# Patient Record
Sex: MALE | Race: WHITE | NOT HISPANIC OR LATINO | Employment: OTHER | URBAN - METROPOLITAN AREA
[De-identification: names, ages, dates, MRNs, and addresses within clinical notes are randomized per-mention and may not be internally consistent; named-entity substitution may affect disease eponyms.]

---

## 2021-02-08 ENCOUNTER — APPOINTMENT (EMERGENCY)
Dept: RADIOLOGY | Facility: HOSPITAL | Age: 71
End: 2021-02-08
Payer: MEDICARE

## 2021-02-08 ENCOUNTER — HOSPITAL ENCOUNTER (EMERGENCY)
Facility: HOSPITAL | Age: 71
Discharge: HOME | End: 2021-02-08
Attending: EMERGENCY MEDICINE | Admitting: EMERGENCY MEDICINE
Payer: MEDICARE

## 2021-02-08 VITALS
DIASTOLIC BLOOD PRESSURE: 67 MMHG | BODY MASS INDEX: 22.73 KG/M2 | WEIGHT: 153.44 LBS | SYSTOLIC BLOOD PRESSURE: 124 MMHG | HEART RATE: 75 BPM | TEMPERATURE: 98.4 F | HEIGHT: 69 IN | RESPIRATION RATE: 18 BRPM | OXYGEN SATURATION: 99 %

## 2021-02-08 DIAGNOSIS — S46.211A RUPTURE OF RIGHT BICEPS TENDON, INITIAL ENCOUNTER: Primary | ICD-10-CM

## 2021-02-08 LAB
ANION GAP SERPL CALCULATED.3IONS-SCNC: 8 MMOL/L (ref 4–13)
APTT PPP: 25 SECONDS (ref 23–37)
BASOPHILS # BLD AUTO: 0.03 THOUSANDS/ΜL (ref 0–0.1)
BASOPHILS NFR BLD AUTO: 0 % (ref 0–1)
BUN SERPL-MCNC: 18 MG/DL (ref 5–25)
CALCIUM SERPL-MCNC: 8.4 MG/DL (ref 8.3–10.1)
CHLORIDE SERPL-SCNC: 101 MMOL/L (ref 100–108)
CO2 SERPL-SCNC: 30 MMOL/L (ref 21–32)
CREAT SERPL-MCNC: 1.06 MG/DL (ref 0.6–1.3)
EOSINOPHIL # BLD AUTO: 0.02 THOUSAND/ΜL (ref 0–0.61)
EOSINOPHIL NFR BLD AUTO: 0 % (ref 0–6)
ERYTHROCYTE [DISTWIDTH] IN BLOOD BY AUTOMATED COUNT: 11.9 % (ref 11.6–15.1)
GFR SERPL CREATININE-BSD FRML MDRD: 70 ML/MIN/1.73SQ M
GLUCOSE SERPL-MCNC: 133 MG/DL (ref 65–140)
HCT VFR BLD AUTO: 37.6 % (ref 36.5–49.3)
HGB BLD-MCNC: 12.3 G/DL (ref 12–17)
IMM GRANULOCYTES # BLD AUTO: 0.04 THOUSAND/UL (ref 0–0.2)
IMM GRANULOCYTES NFR BLD AUTO: 0 % (ref 0–2)
INR PPP: 1.08 (ref 0.84–1.19)
LYMPHOCYTES # BLD AUTO: 0.79 THOUSANDS/ΜL (ref 0.6–4.47)
LYMPHOCYTES NFR BLD AUTO: 8 % (ref 14–44)
MCH RBC QN AUTO: 31.2 PG (ref 26.8–34.3)
MCHC RBC AUTO-ENTMCNC: 32.7 G/DL (ref 31.4–37.4)
MCV RBC AUTO: 95 FL (ref 82–98)
MONOCYTES # BLD AUTO: 0.44 THOUSAND/ΜL (ref 0.17–1.22)
MONOCYTES NFR BLD AUTO: 4 % (ref 4–12)
NEUTROPHILS # BLD AUTO: 8.77 THOUSANDS/ΜL (ref 1.85–7.62)
NEUTS SEG NFR BLD AUTO: 88 % (ref 43–75)
NRBC BLD AUTO-RTO: 0 /100 WBCS
PLATELET # BLD AUTO: 234 THOUSANDS/UL (ref 149–390)
PMV BLD AUTO: 9.5 FL (ref 8.9–12.7)
POTASSIUM SERPL-SCNC: 3.8 MMOL/L (ref 3.5–5.3)
PROTHROMBIN TIME: 13.9 SECONDS (ref 11.6–14.5)
RBC # BLD AUTO: 3.94 MILLION/UL (ref 3.88–5.62)
SODIUM SERPL-SCNC: 139 MMOL/L (ref 136–145)
WBC # BLD AUTO: 10.09 THOUSAND/UL (ref 4.31–10.16)

## 2021-02-08 PROCEDURE — 73201 CT UPPER EXTREMITY W/DYE: CPT

## 2021-02-08 PROCEDURE — 99283 EMERGENCY DEPT VISIT LOW MDM: CPT | Performed by: PHYSICIAN ASSISTANT

## 2021-02-08 PROCEDURE — 80048 BASIC METABOLIC PNL TOTAL CA: CPT | Performed by: PHYSICIAN ASSISTANT

## 2021-02-08 PROCEDURE — 99284 EMERGENCY DEPT VISIT MOD MDM: CPT

## 2021-02-08 PROCEDURE — 36415 COLL VENOUS BLD VENIPUNCTURE: CPT | Performed by: PHYSICIAN ASSISTANT

## 2021-02-08 PROCEDURE — 85610 PROTHROMBIN TIME: CPT | Performed by: PHYSICIAN ASSISTANT

## 2021-02-08 PROCEDURE — 85730 THROMBOPLASTIN TIME PARTIAL: CPT | Performed by: PHYSICIAN ASSISTANT

## 2021-02-08 PROCEDURE — 85025 COMPLETE CBC W/AUTO DIFF WBC: CPT | Performed by: PHYSICIAN ASSISTANT

## 2021-02-08 RX ADMIN — IOHEXOL 100 ML: 350 INJECTION, SOLUTION INTRAVENOUS at 09:38

## 2021-02-08 NOTE — ED PROVIDER NOTES
History  Chief Complaint   Patient presents with    Arm Pain     Pt reports bruising and arm pain that stsarted yesterday after shoveling snow  Pt noted with swollen and ecchymotic right arm  68 y/o male presenting today with right upper extremity pain bruising and swelling that began yesterday at 5:00 p m  Sophia Serum Patient states that over the past week he has been shoveling snow and he is right-hand dominant  Did not have distinct pain while shoveling however when he was in the shower and went to go lift his arm he felt a pull in the right upper extremity  Since that point has had worsening swelling bruising  Does not have a significant amount of pain or weakness  Patient states that he is generally healthy is not on any medications including blood thinners  Denies numbness, paresthesias, chest pain, shortness of breath, abdominal pain  Prior to Admission Medications   Prescriptions Last Dose Informant Patient Reported? Taking? Omega-3 Fatty Acids (FISH OIL PO)  Self Yes Yes   Sig: Take by mouth   VITAMIN D PO  Self Yes Yes   Sig: Take 1,000 mg by mouth      Facility-Administered Medications: None       History reviewed  No pertinent past medical history  History reviewed  No pertinent surgical history  History reviewed  No pertinent family history  I have reviewed and agree with the history as documented  E-Cigarette/Vaping    E-Cigarette Use Never User      E-Cigarette/Vaping Substances     Social History     Tobacco Use    Smoking status: Former Smoker    Smokeless tobacco: Never Used   Substance Use Topics    Alcohol use: Yes     Frequency: Monthly or less     Comment: socially    Drug use: Never       Review of Systems   Constitutional: Negative  HENT: Negative  Eyes: Negative  Respiratory: Negative  Cardiovascular: Negative  Gastrointestinal: Negative  Genitourinary: Negative  Musculoskeletal: Positive for arthralgias and joint swelling   Negative for back pain, gait problem, myalgias, neck pain and neck stiffness  Skin: Positive for color change  Negative for pallor, rash and wound  Neurological: Negative  All other systems reviewed and are negative  Physical Exam  Physical Exam  Vitals signs and nursing note reviewed  Constitutional:       Appearance: Normal appearance  He is normal weight  HENT:      Head: Normocephalic and atraumatic  Right Ear: External ear normal       Left Ear: External ear normal       Nose: Nose normal       Mouth/Throat:      Mouth: Mucous membranes are moist       Pharynx: Oropharynx is clear  Neck:      Musculoskeletal: Normal range of motion  Cardiovascular:      Rate and Rhythm: Normal rate  Pulses: Normal pulses  Heart sounds: Normal heart sounds  Pulmonary:      Effort: Pulmonary effort is normal       Breath sounds: Normal breath sounds  Comments: S PO2 is 100% indicating adequate oxygenation  Skin:     General: Skin is warm and dry  Capillary Refill: Capillary refill takes less than 2 seconds  Coloration: Skin is not jaundiced or pale  Findings: Bruising present  No erythema, lesion or rash  Neurological:      General: No focal deficit present  Mental Status: He is alert  Mental status is at baseline     Psychiatric:         Mood and Affect: Mood normal          Vital Signs  ED Triage Vitals [02/08/21 0826]   Temperature Pulse Respirations Blood Pressure SpO2   98 2 °F (36 8 °C) 77 18 152/67 100 %      Temp Source Heart Rate Source Patient Position - Orthostatic VS BP Location FiO2 (%)   Temporal Monitor Sitting Left arm --      Pain Score       6           Vitals:    02/08/21 0826 02/08/21 0853 02/08/21 1100   BP: 152/67 129/69 124/67   Pulse: 77 70 75   Patient Position - Orthostatic VS: Sitting Sitting Sitting         Visual Acuity      ED Medications  Medications   iohexol (OMNIPAQUE) 350 MG/ML injection (SINGLE-DOSE) 100 mL (100 mL Intravenous Given 2/8/21 6246)       Diagnostic Studies  Results Reviewed     Procedure Component Value Units Date/Time    Protime-INR [[id number]]  (Normal) Collected: 02/08/21 0907    Lab Status: Final result Specimen: Blood from Arm, Left Updated: 02/08/21 0925     Protime 13 9 seconds      INR 1 08    APTT [[id number]]  (Normal) Collected: 02/08/21 0907    Lab Status: Final result Specimen: Blood from Arm, Left Updated: 02/08/21 0925     PTT 25 seconds     Basic metabolic panel [[id number]] Collected: 02/08/21 0907    Lab Status: Final result Specimen: Blood from Arm, Left Updated: 02/08/21 0924     Sodium 139 mmol/L      Potassium 3 8 mmol/L      Chloride 101 mmol/L      CO2 30 mmol/L      ANION GAP 8 mmol/L      BUN 18 mg/dL      Creatinine 1 06 mg/dL      Glucose 133 mg/dL      Calcium 8 4 mg/dL      eGFR 70 ml/min/1 73sq m     Narrative:      [place] guidelines for Chronic Kidney Disease (CKD):     Stage 1 with normal or high GFR (GFR > 90 mL/min/1 73 square meters)    Stage 2 Mild CKD (GFR = 60-89 mL/min/1 73 square meters)    Stage 3A Moderate CKD (GFR = 45-59 mL/min/1 73 square meters)    Stage 3B Moderate CKD (GFR = 30-44 mL/min/1 73 square meters)    Stage 4 Severe CKD (GFR = 15-29 mL/min/1 73 square meters)    Stage 5 End Stage CKD (GFR <15 mL/min/1 73 square meters)  Note: GFR calculation is accurate only with a steady state creatinine    CBC and differential [[id number]]  (Abnormal) Collected: 02/08/21 0907    Lab Status: Final result Specimen: Blood from Arm, Left Updated: 02/08/21 0914     WBC 10 09 Thousand/uL      RBC 3 94 Million/uL      Hemoglobin 12 3 g/dL      Hematocrit 37 6 %      MCV 95 fL      MCH 31 2 pg      MCHC 32 7 g/dL      RDW 11 9 %      MPV 9 5 fL      Platelets 309 Thousands/uL      nRBC 0 /100 WBCs      Neutrophils Relative 88 %      Immat GRANS % 0 %      Lymphocytes Relative 8 %      Monocytes Relative 4 %      Eosinophils Relative 0 %      Basophils Relative 0 % Neutrophils Absolute 8 77 Thousands/µL      Immature Grans Absolute 0 04 Thousand/uL      Lymphocytes Absolute 0 79 Thousands/µL      Monocytes Absolute 0 44 Thousand/µL      Eosinophils Absolute 0 02 Thousand/µL      Basophils Absolute 0 03 Thousands/µL                  CT upper extremity w contrast right   Final Result by Mo Marquez MD (02/08 1018)      Probable complete rupture of the proximal long head biceps tendon with retraction into the distal arm  Associated hemorrhage and edema in the biceps musculature  Distal biceps tendon grossly intact  Probable grade 2 strain of the proximal short head of the biceps muscle  Subcutaneous edema along the medial aspect of the arm is almost certainly reactive  No acute osseous abnormality  The study was marked in [facility] for immediate notification  Workstation performed: [id number]                    Procedures  Procedures         ED Course                             SBIRT 20yo+      Most Recent Value   SBIRT (24 yo +)   In order to provide better care to our patients, we are screening all of our patients for alcohol and drug use  Would it be okay to ask you these screening questions? No Filed at: 02/08/2021 0941                    MDM  Number of Diagnoses or Management Options  Rupture of right biceps tendon, initial encounter:   Diagnosis management comments: Reached out to Dr El Wheatley regarding imaging studies and he would like patient to follow up with Dr Jennifer Martinez in the outpatient office and states non operative management  Patient for comfort was placed in a sling and assessed by me with good neurovascular exam before and after and given education regarding how to use a sling and ways to avoid a stiff joint  Patient given thorough education regarding biceps tendon rupture and understands that there may be chronic deformity to the right upper extremity given rupture as well as chronic weakness   Patient does however have good ROM and 4/5 strength of RUE  Patient is informed to return to the emergency department for worsening of symptoms such as severe pain, numbness of extremity etc and was given proper education regarding their diagnosis and symptoms  Otherwise the patient is informed to follow up with orthopedics for re-evaluation  The patient verbalizes understanding and agrees with above assessment and plan  All questions were answered  Please Note: Fluency Direct voice recognition software may have been used in the creation of this document  Wrong words or sound a like substitutions may have occurred due to the inherent limitations of the voice software  Amount and/or Complexity of Data Reviewed  Clinical lab tests: ordered and reviewed  Tests in the radiology section of CPT®: reviewed and ordered  Review and summarize past medical records: yes  Independent visualization of images, tracings, or specimens: yes        Disposition  Final diagnoses:   Rupture of right biceps tendon, initial encounter     Time reflects when diagnosis was documented in both MDM as applicable and the Disposition within this note     Time User Action Codes Description Comment    2/8/2021 10:42 AM Fadumo Herron Add [E73 [id number]] Rupture of right biceps tendon, initial encounter       ED Disposition     ED Disposition Condition Date/Time Comment    Discharge Stable Mon Feb 8, 2021 10:42 AM Britta Madden discharge to home/self care              Follow-up Information     Follow up With Specialties Details Why Contact Info Additional [address] Emergency Department Emergency Medicine Go to  If symptoms worsen such as numbness of extremitiy, chest pain, shortness of breath etc [address] [facility] Emergency Department, [facility], [address]    Devon Costa MD Orthopedic Surgery Schedule an appointment as soon as possible for a visit in 1 day  [address] [facility]  [phone]             Patient's Medications   Discharge Prescriptions    No medications on file     No discharge procedures on file      PDMP Review     None          ED Provider  Electronically Signed by           Lam Saleh PA-C  02/08/21 1111

## 2021-02-09 ENCOUNTER — HOSPITAL ENCOUNTER (OUTPATIENT)
Dept: RADIOLOGY | Facility: HOSPITAL | Age: 71
Discharge: HOME | End: 2021-02-09
Attending: ORTHOPAEDIC SURGERY
Payer: MEDICARE

## 2021-02-09 VITALS
WEIGHT: 154.6 LBS | HEART RATE: 64 BPM | SYSTOLIC BLOOD PRESSURE: 149 MMHG | HEIGHT: 69 IN | BODY MASS INDEX: 22.9 KG/M2 | DIASTOLIC BLOOD PRESSURE: 80 MMHG

## 2021-02-09 DIAGNOSIS — M79.89 LEFT UPPER EXTREMITY SWELLING: ICD-10-CM

## 2021-02-09 DIAGNOSIS — M24.9 INTERNAL DERANGEMENT OF ELBOW: ICD-10-CM

## 2021-02-09 DIAGNOSIS — M79.601 PAIN AND SWELLING OF RIGHT UPPER EXTREMITY: ICD-10-CM

## 2021-02-09 DIAGNOSIS — M79.89 PAIN AND SWELLING OF RIGHT UPPER EXTREMITY: ICD-10-CM

## 2021-02-09 DIAGNOSIS — M24.9 INTERNAL DERANGEMENT OF ELBOW: Primary | ICD-10-CM

## 2021-02-09 PROCEDURE — 99204 OFFICE O/P NEW MOD 45 MIN: CPT | Performed by: ORTHOPAEDIC SURGERY

## 2021-02-09 PROCEDURE — 73221 MRI JOINT UPR EXTREM W/O DYE: CPT

## 2021-02-09 PROCEDURE — G1004 CDSM NDSC: HCPCS

## 2021-02-09 NOTE — PROGRESS NOTES
Assessment/Plan:  1  Internal derangement of elbow  MRI elbow right wo contrast   2  Pain and swelling of right upper extremity  MRI elbow right wo contrast       Scribe Attestation    I,:  Susana Roberts am acting as a scribe while in the presence of the attending physician :       I,:  Ilene Kennedy MD personally performed the services described in this documentation    as scribed in my presence :         Martin Rubinstein upon examination in review the CT scan of the right upper extremity does demonstrate signs and symptoms that are concerning for a distal biceps tendon rupture  He demonstrates significant weakness into supination  In addition,  His distal biceps tendon is not well palpated on exam or during hook test    The biceps does appear to have a proximal retraction from the elbow verses a distal retraction from the shoulder  The CT scan demonstrates moderate soft tissue swelling at the elbow with poor visualization of the distal biceps tendon  As I do have significant concern for distal biceps tendon rupture would like to order an MRI of the right elbow to question this today  My office will help facilitate having this MRI completed as soon as possible  I would like Martin Rubinstein to follow up when the MRI of his right elbow is completed  Subjective:   Ruba Anders is a 69 y o  male who presents to the office today for   Initial evaluation of his right arm and elbow  He notes that on 2/7/2021 he was shoveling the snow and had some discomfort to his right arm  He denies a traumatic pop at that time  However later that evening when he was in the shower he tried to wash his hair and that when he experienced a painful pulling sensation into the anterior aspect of his shoulder and arm  This did result in significant bruising developing into the upper arm extending into the forearm, and wrist   He states that he has limitations with strength with trying to turn his palm up as well as elbow flexion    He notes that his shoulder has maintain range of motion and strength  He has also noticed a increased definition of the biceps area since the incident in the shower  Aram Aviles does recount an incident earlier in the year 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 he did over extend his right arm I did have a painful pulling and pop sensation into the anterior aspect of his elbow  However this did resolve its own with no development of bruising or swelling  Today he denies any distal paresthesias  He is not on any anticoagulants  He was evaluated at the emergency department and did receive a CT scan of the right upper extremity  Review of Systems   Constitutional: Negative for chills, fever and unexpected weight change  HENT: Negative for hearing loss, nosebleeds and sore throat  Eyes: Negative for pain, redness and visual disturbance  Respiratory: Negative for cough, shortness of breath and wheezing  Cardiovascular: Negative for chest pain, palpitations and leg swelling  Gastrointestinal: Negative for abdominal pain, nausea and vomiting  Endocrine: Negative for polyphagia and polyuria  Genitourinary: Negative for dysuria and hematuria  Musculoskeletal: Positive for arthralgias and myalgias  See HPI   Skin: Negative for rash and wound  Neurological: Negative for dizziness, numbness and headaches  Psychiatric/Behavioral: Negative for decreased concentration and suicidal ideas  The patient is not nervous/anxious  History reviewed  No pertinent past medical history  History reviewed  No pertinent surgical history      Family History   Problem Relation Age of Onset    No Known Problems Mother     No Known Problems Father        Social History     Occupational History    Not on file   Tobacco Use    Smoking status: Former Smoker    Smokeless tobacco: Never Used   Substance and Sexual Activity    Alcohol use: Yes     Frequency: Monthly or less     Comment: socially    Drug use: Never    Sexual activity: Not on file         Current Outpatient Medications:     Omega-3 Fatty Acids (FISH OIL PO), Take by mouth, Disp: , Rfl:     VITAMIN D PO, Take 1,000 mg by mouth, Disp: , Rfl:     No Known Allergies    Objective:  Vitals:    02/09/21 1007   BP: 149/80   Pulse: 64       Right Elbow Exam     Tenderness   Right elbow tenderness location: distal biceps  Range of Motion   Right elbow extension: -5    Flexion: 110   Pronation: 0   Supination: 110     Muscle Strength   Pronation:  5/5   Supination:  3/5     Other   Erythema: absent  Scars: absent  Sensation: normal  Pulse: present    Comments:  Moderate to severe ecchymosis of the anterior and medial upper arm extending into the forearm and wrist     Distal biceps is not well palpated during with Hook test    Muscle bundling of the biceps with appearance of proximal retraction from the elbow            Physical Exam  Vitals signs reviewed  HENT:      Head: Normocephalic and atraumatic  Eyes:      General:         Right eye: No discharge  Left eye: No discharge  Conjunctiva/sclera: Conjunctivae normal       Pupils: Pupils are equal, round, and reactive to light  Neck:      Musculoskeletal: Normal range of motion and neck supple  Cardiovascular:      Rate and Rhythm: Normal rate  Pulmonary:      Effort: Pulmonary effort is normal  No respiratory distress  Musculoskeletal:      Comments: As noted in HPI   Skin:     General: Skin is warm and dry  Neurological:      Mental Status: He is alert and oriented to person, place, and time  Psychiatric:         Mood and Affect: Mood normal          Behavior: Behavior normal          I have personally reviewed pertinent films in PACS and my interpretation is as follows:     CT scan of the right upper extremity demonstrates   Moderate soft tissue swelling at the elbow    Distal biceps is not well visualized

## 2021-02-10 VITALS
HEIGHT: 69 IN | HEART RATE: 68 BPM | WEIGHT: 154.6 LBS | SYSTOLIC BLOOD PRESSURE: 126 MMHG | BODY MASS INDEX: 22.9 KG/M2 | DIASTOLIC BLOOD PRESSURE: 59 MMHG

## 2021-02-10 DIAGNOSIS — S46.211D BICEPS MUSCLE TEAR, RIGHT, SUBSEQUENT ENCOUNTER: Primary | ICD-10-CM

## 2021-02-10 DIAGNOSIS — M25.421 SWELLING OF JOINT OF UPPER ARM, RIGHT: ICD-10-CM

## 2021-02-10 PROCEDURE — 99214 OFFICE O/P EST MOD 30 MIN: CPT | Performed by: ORTHOPAEDIC SURGERY

## 2021-02-10 NOTE — PROGRESS NOTES
Assessment/Plan:  1  Biceps muscle tear, right, subsequent encounter     2  Swelling of joint of upper arm, right         Scribe Attestation    I,:  Susana Alejandra am acting as a scribe while in the presence of the attending physician :       I,:  Ilene Kennedy MD personally performed the services described in this documentation    as scribed in my presence :             Martin Rubinstein upon examination and review the MRI of the right elbow does demonstrate intact distal biceps tendon  There is edema present in the muscle belly of the biceps  I did remark that this is likely the cause of the significant swelling and bruising  I did note that the bleeding as result of the muscle injury is within a tissue plane that has resulted in extensive extension into the arm as well as into the chest and rib area  He does not demonstrate any point tenderness at the coracoid today as there is concern of a grade 2 strain of the biceps short head on previous CT  Given the findings on the MRI as well as his functional movement of the shoulder and elbow, I do not recommend surgical intervention at this time  I do believe that he will continue to improve in regards to the swelling and bruising as time goes on and encouraged him to continue moving the right upper extremity  I did remark however that I would pay attention to possible symptoms such as dizziness, lightheadedness, or feeling faint as he did bleed quite a bit from his injury  His labs do demonstrate normal levels of hemoglobin and hematocrit  I did remark that should he develop those symptoms, he should follow up in the emergency department and have his labs drawn as these levels may be affected due to the blood loss  I did also advise him to ice his biceps for approximately 15 minutes that time 2 to 3 times a day  Otherwise, he may resume activities of daily living as tolerated    Martin Rubinstein verbalized understanding of all information provided to him today and had no further questions  I will see him back on an as-needed basis  Subjective:   Leona Varghese is a 69 y o  male who presents to the office today for  Follow-up evaluation of his right elbow, and arm  He was evaluated yesterday 2/9/2021 as result of bruising that developed following shoveling of the snow on 2/7/2021  He states that the bruising has extended into the thumb area  He states that he is pain-free and notes that his range of motion of the elbow has improved since yesterday's evaluation  He denies any distal paresthesias  He notes that he is able to actively range his shoulder with little difficulty and notes that his elbow slightly restricted secondary to swelling  He denies any symptoms of dizziness, or nausea  He does remark that he did have labs drawn at the hospital when he was evaluated on 2/8/2021  He did have an MRI of his right elbow completed yesterday to be reviewed today  Review of Systems   Constitutional: Negative for chills, fever and unexpected weight change  HENT: Negative for hearing loss, nosebleeds and sore throat  Eyes: Negative for pain, redness and visual disturbance  Respiratory: Negative for cough, shortness of breath and wheezing  Cardiovascular: Negative for chest pain, palpitations and leg swelling  Gastrointestinal: Negative for abdominal pain, nausea and vomiting  Endocrine: Negative for polyphagia and polyuria  Genitourinary: Negative for dysuria and hematuria  Musculoskeletal: Positive for myalgias (mild soreness arm)  See HPI   Skin: Negative for rash and wound  Neurological: Negative for dizziness, numbness and headaches  Psychiatric/Behavioral: Negative for decreased concentration and suicidal ideas  The patient is not nervous/anxious  History reviewed  No pertinent past medical history  History reviewed  No pertinent surgical history      Family History   Problem Relation Age of Onset    No Known Problems Mother     No Known Problems Father        Social History     Occupational History    Not on file   Tobacco Use    Smoking status: Former Smoker    Smokeless tobacco: Never Used   Substance and Sexual Activity    Alcohol use: Yes     Frequency: Monthly or less     Comment: socially    Drug use: Never    Sexual activity: Not on file         Current Outpatient Medications:     Omega-3 Fatty Acids (FISH OIL PO), Take by mouth, Disp: , Rfl:     VITAMIN D PO, Take 1,000 mg by mouth, Disp: , Rfl:     No Known Allergies    Objective:  Vitals:    02/10/21 0925   BP: 126/59   Pulse: 68       Right Elbow Exam     Range of Motion   Extension: 0   Flexion: 120     Other   Erythema: absent  Scars: absent  Sensation: normal  Pulse: present    Comments:    Elbow flexion:  5/5     moderate ecchymosis of the upper arm extending into the forearm, wrist and thenar eminence  Right Shoulder Exam     Tenderness   The patient is experiencing no tenderness  Range of Motion   Active abduction: 170   External rotation: 70   Internal rotation 0 degrees: L3     Muscle Strength   Abduction: 5/5   Internal rotation: 5/5   External rotation: 5/5             Physical Exam  Vitals signs reviewed  HENT:      Head: Normocephalic and atraumatic  Eyes:      General:         Right eye: No discharge  Left eye: No discharge  Conjunctiva/sclera: Conjunctivae normal       Pupils: Pupils are equal, round, and reactive to light  Neck:      Musculoskeletal: Normal range of motion and neck supple  Cardiovascular:      Rate and Rhythm: Normal rate  Pulmonary:      Effort: Pulmonary effort is normal  No respiratory distress  Musculoskeletal:      Comments: As noted in HPI   Skin:     General: Skin is warm and dry  Neurological:      Mental Status: He is alert and oriented to person, place, and time     Psychiatric:         Mood and Affect: Mood normal          Behavior: Behavior normal          I have personally reviewed pertinent films in PACS and my interpretation is as follows:     MRI of the right elbow demonstrates an intact distal biceps tendon  There is mild signal uptake indicating tendinitis  Edema is present in the biceps muscle suggestive of a strain

## 2023-01-14 PROBLEM — Z00.00 MEDICARE ANNUAL WELLNESS VISIT, SUBSEQUENT: Status: ACTIVE | Noted: 2023-01-14

## 2023-01-14 PROBLEM — H40.013 OPEN ANGLE WITH BORDERLINE FINDINGS, LOW RISK, BILATERAL: Status: ACTIVE | Noted: 2023-01-14

## 2023-01-14 PROBLEM — Z13.83 SCREENING FOR CARDIOVASCULAR, RESPIRATORY, AND GENITOURINARY DISEASES: Status: ACTIVE | Noted: 2023-01-14

## 2023-01-14 PROBLEM — Z13.89 SCREENING FOR CARDIOVASCULAR, RESPIRATORY, AND GENITOURINARY DISEASES: Status: ACTIVE | Noted: 2023-01-14

## 2023-01-14 PROBLEM — Z12.5 PROSTATE CANCER SCREENING: Status: ACTIVE | Noted: 2023-01-14

## 2023-01-14 PROBLEM — Z12.11 COLON CANCER SCREENING: Status: ACTIVE | Noted: 2023-01-14

## 2023-01-14 PROBLEM — Z13.6 SCREENING FOR CARDIOVASCULAR, RESPIRATORY, AND GENITOURINARY DISEASES: Status: ACTIVE | Noted: 2023-01-14

## 2023-01-14 PROBLEM — Z13.1 SCREENING FOR DIABETES MELLITUS (DM): Status: ACTIVE | Noted: 2023-01-14

## 2023-01-18 ENCOUNTER — OFFICE VISIT (OUTPATIENT)
Dept: FAMILY MEDICINE CLINIC | Facility: CLINIC | Age: 73
End: 2023-01-18

## 2023-01-18 VITALS
SYSTOLIC BLOOD PRESSURE: 118 MMHG | DIASTOLIC BLOOD PRESSURE: 70 MMHG | TEMPERATURE: 98 F | OXYGEN SATURATION: 97 % | RESPIRATION RATE: 16 BRPM | HEART RATE: 61 BPM | WEIGHT: 144 LBS | BODY MASS INDEX: 21.82 KG/M2 | HEIGHT: 68 IN

## 2023-01-18 DIAGNOSIS — Z13.83 SCREENING FOR CARDIOVASCULAR, RESPIRATORY, AND GENITOURINARY DISEASES: ICD-10-CM

## 2023-01-18 DIAGNOSIS — Z13.6 SCREENING FOR CARDIOVASCULAR, RESPIRATORY, AND GENITOURINARY DISEASES: ICD-10-CM

## 2023-01-18 DIAGNOSIS — Z12.5 PROSTATE CANCER SCREENING: ICD-10-CM

## 2023-01-18 DIAGNOSIS — R41.3 MEMORY DIFFICULTIES: ICD-10-CM

## 2023-01-18 DIAGNOSIS — Z13.1 SCREENING FOR DIABETES MELLITUS (DM): ICD-10-CM

## 2023-01-18 DIAGNOSIS — Z00.00 MEDICARE ANNUAL WELLNESS VISIT, INITIAL: Primary | ICD-10-CM

## 2023-01-18 DIAGNOSIS — Z12.11 COLON CANCER SCREENING: ICD-10-CM

## 2023-01-18 DIAGNOSIS — Z13.89 SCREENING FOR CARDIOVASCULAR, RESPIRATORY, AND GENITOURINARY DISEASES: ICD-10-CM

## 2023-01-18 DIAGNOSIS — M60.9 MYOSITIS, UNSPECIFIED MYOSITIS TYPE, UNSPECIFIED SITE: ICD-10-CM

## 2023-01-18 NOTE — PROGRESS NOTES
Assessment/Plan:    No problem-specific Assessment & Plan notes found for this encounter  Memory difficulties  Start workup  Check labs  Brain imaging  Neurology referral   Possible neuropsychology testing     Diagnoses and all orders for this visit:    Medicare annual wellness visit, subsequent    Colon cancer screening    Screening for cardiovascular, respiratory, and genitourinary diseases  -     Lipid Panel with Direct LDL reflex; Future    Screening for diabetes mellitus (DM)  -     Comprehensive metabolic panel; Future    Prostate cancer screening  -     PSA, Total Screen; Future    Memory difficulties  -     TSH, 3rd generation; Future  -     CBC and differential; Future  -     Vitamin D 25 hydroxy; Future  -     Vitamin B12; Future  -     CT head w contrast; Future  -     Ambulatory referral to Neurology; Future    Myositis, unspecified myositis type, unspecified site  -     Vitamin D 25 hydroxy; Future        Return in about 1 month (around 2/18/2023) for Recheck  Subjective:      Patient ID: Prerna Constantino is a 67 y o  male  Chief Complaint   Patient presents with   • New Patient Visit     Patients brother states patient is having some confusion  Talks to himself  Loss for words  Angry sometimes(unusual for him), hes usually funny  Obsession with washing dishes  Forgot how to turn on oven  Not eating well and has some recent weight loss  Seems lost      • Establish Care     Patient having some forgetfulness   Laverne Elizabeth MA     • Medicare Wellness Visit     New patient here to establish care  Laverne Elizabeth MA         HPI  Goes to [place]  Lives with wife, she had recent surgery  No meds  No med hx  No surgeries  Had colonoscopy at [place], had polyp in past but not on last one    Eats healthy  No etoh?  1 beer per week  No tob  Active, no program    Last labs over 3y ago at [facility] steady per pt in past ?    Issues with memory  Since March 2022  Some word finding problems  No hx of cva or focal weakness or seizure    No depression/anxiety  No excess worries  Worries about wife though    The following portions of the patient's history were reviewed and updated as appropriate: allergies, current medications, past family history, past medical history, past social history, past surgical history and problem list     Review of Systems   Constitutional: Negative for chills and fever  Neurological: Negative for syncope  Current Outpatient Medications   Medication Sig Dispense Refill   • VITAMIN D PO Take 1,000 mg by mouth       No current facility-administered medications for this visit  Objective:    /70   Pulse 61   Temp 98 °F (36 7 °C)   Resp 16   Ht 5' 8 25" (1 734 m)   Wt 65 3 kg (144 lb)   SpO2 97%   BMI 21 74 kg/m²        Physical Exam  Vitals and nursing note reviewed  Constitutional:       General: He is not in acute distress  Appearance: He is well-developed  He is not ill-appearing  HENT:      Head: Normocephalic  Right Ear: Tympanic membrane normal       Left Ear: Tympanic membrane normal       Mouth/Throat:      Pharynx: No oropharyngeal exudate  Eyes:      General:         Left eye: No discharge  Conjunctiva/sclera: Conjunctivae normal    Cardiovascular:      Rate and Rhythm: Normal rate and regular rhythm  Heart sounds: No murmur heard  Pulmonary:      Effort: Pulmonary effort is normal  No respiratory distress  Breath sounds: No wheezing or rales  Abdominal:      General: There is no distension  Palpations: Abdomen is soft  Tenderness: There is no abdominal tenderness  Musculoskeletal:         General: No deformity  Cervical back: Neck supple  No tenderness  Right lower leg: No edema  Lymphadenopathy:      Cervical: No cervical adenopathy  Skin:     General: Skin is warm and dry  Coloration: Skin is not pale  Neurological:      General: No focal deficit present  Mental Status: He is alert        Cranial Nerves: No cranial nerve deficit  Psychiatric:         Mood and Affect: Mood normal  Mood is not anxious or depressed  Behavior: Behavior normal          Thought Content:  Thought content normal                 Pearl Patino DO

## 2023-01-19 NOTE — PROGRESS NOTES
Assessment and Plan:     Problem List Items Addressed This Visit        Active Problems    Medicare annual wellness visit, subsequent - Primary    Colon cancer screening    Screening for cardiovascular, respiratory, and genitourinary diseases    Relevant Orders    Lipid Panel with Direct LDL reflex    Screening for diabetes mellitus (DM)    Relevant Orders    Comprehensive metabolic panel    Prostate cancer screening    Relevant Orders    PSA, Total Screen    Memory difficulties    Relevant Orders    TSH, 3rd generation    CBC and differential    Vitamin D 25 hydroxy    Vitamin B12    CT head w contrast    Ambulatory referral to Neurology   Other Visit Diagnoses     Myositis, unspecified myositis type, unspecified site        Relevant Orders    Vitamin D 25 hydroxy           Preventive health issues were discussed with patient, and age appropriate screening tests were ordered as noted in patient's After Visit Summary  Personalized health advice and appropriate referrals for health education or preventive services given if needed, as noted in patient's After Visit Summary  History of Present Illness:     Patient presents for a Medicare Wellness Visit    HPI   Patient Care Team:  Yousuf Lo DO as PCP - General (Family Medicine)     Review of Systems:     Review of Systems     Problem List:     Patient Active Problem List   Diagnosis   • Open angle with borderline findings, low risk, bilateral   • Medicare annual wellness visit, subsequent   • Colon cancer screening   • Screening for cardiovascular, respiratory, and genitourinary diseases   • Screening for diabetes mellitus (DM)   • Prostate cancer screening   • Memory difficulties      Past Medical and Surgical History:     History reviewed  No pertinent past medical history  History reviewed  No pertinent surgical history     Family History:     Family History   Problem Relation Age of Onset   • No Known Problems Mother    • No Known Problems Father Social History:     Social History     Socioeconomic History   • Marital status: /Civil Union     Spouse name: None   • Number of children: None   • Years of education: None   • Highest education level: None   Occupational History   • None   Tobacco Use   • Smoking status: Former     Packs/day: 1 00     Types: Cigarettes     Start date: 1952     Quit date: 1989     Years since quittin 9   • Smokeless tobacco: Never   Vaping Use   • Vaping Use: Never used   Substance and Sexual Activity   • Alcohol use: Yes     Comment: socially   • Drug use: Never   • Sexual activity: None   Other Topics Concern   • None   Social History Narrative   • None     Social Determinants of Health     Financial Resource Strain: Low Risk    • Difficulty of Paying Living Expenses: Not hard at all   Food Insecurity: Not on file   Transportation Needs: No Transportation Needs   • Lack of Transportation (Medical): No   • Lack of Transportation (Non-Medical): No   Physical Activity: Not on file   Stress: Not on file   Social Connections: Not on file   Intimate Partner Violence: Not on file   Housing Stability: Not on file      Medications and Allergies:     Current Outpatient Medications   Medication Sig Dispense Refill   • VITAMIN D PO Take 1,000 mg by mouth       No current facility-administered medications for this visit  No Known Allergies   Immunizations:     Immunization History   Administered Date(s) Administered   • Tdap 2012      Health Maintenance:         Topic Date Due   • Hepatitis C Screening  Never done   • Colorectal Cancer Screening  Never done         Topic Date Due   • COVID-19 Vaccine (1) Never done   • Pneumococcal Vaccine: 65+ Years (1 - PCV) Never done      Medicare Screening Tests and Risk Assessments:     Mike Thomas is here for his Initial Wellness visit  Health Risk Assessment:   Patient rates overall health as very good  Patient feels that their physical health rating is same   Patient is satisfied with their life  Eyesight was rated as same  Hearing was rated as slightly worse  Patient feels that their emotional and mental health rating is same  Patients states they are sometimes angry  Patient states they are sometimes unusually tired/fatigued  Pain experienced in the last 7 days has been none  Depression Screening:   PHQ-2 Score: 0      Fall Risk Screening: In the past year, patient has experienced: no history of falling in past year      Home Safety:  Patient does not have trouble with stairs inside or outside of their home  Patient has working smoke alarms and has working carbon monoxide detector  Home safety hazards include: none  Nutrition:   Current diet is Regular  Medications:   Patient is currently taking over-the-counter supplements  OTC medications include: see medication list  Patient is not able to manage medications  Activities of Daily Living (ADLs)/Instrumental Activities of Daily Living (IADLs):   Walk and transfer into and out of bed and chair?: Yes  Dress and groom yourself?: Yes    Bathe or shower yourself?: Yes    Feed yourself? Yes  Do your laundry/housekeeping?: Yes  Manage your money, pay your bills and track your expenses?: Yes  Make your own meals?: Yes    Do your own shopping?: Yes    Previous Hospitalizations:   Any hospitalizations or ED visits within the last 12 months?: No      Advance Care Planning:   Living will: Yes    Durable POA for healthcare:  Yes    Advanced directive: Yes    End of Life Decisions reviewed with patient: No      PREVENTIVE SCREENINGS      Cardiovascular Screening:    General: Risks and Benefits Discussed      Diabetes Screening:     General: Risks and Benefits Discussed      Colorectal Cancer Screening:     General: Risks and Benefits Discussed      Prostate Cancer Screening:    General: Risks and Benefits Discussed      Osteoporosis Screening:    General: Screening Not Indicated      Abdominal Aortic Aneurysm (AAA) Screening:    Risk factors include: age between 73-67 yo and tobacco use        General: Screening Not Indicated      Lung Cancer Screening:     General: Screening Not Indicated      Hepatitis C Screening:    General: Risks and Benefits Discussed    Hep C Screening Accepted: Yes      Screening, Brief Intervention, and Referral to Treatment (SBIRT)    Screening  Typical number of drinks in a day: 1  Typical number of drinks in a week: 1  Interpretation: Low risk drinking behavior  Single Item Drug Screening:  How often have you used an illegal drug (including marijuana) or a prescription medication for non-medical reasons in the past year? never    Single Item Drug Screen Score: 0  Interpretation: Negative screen for possible drug use disorder    Other Counseling Topics:   Car/seat belt/driving safety and regular weightbearing exercise  No results found       Physical Exam:     /70   Pulse 61   Temp 98 °F (36 7 °C)   Resp 16   Ht 5' 8 25" (1 734 m)   Wt 65 3 kg (144 lb)   SpO2 97%   BMI 21 74 kg/m²     Physical Exam     David Malik DO

## 2023-01-19 NOTE — PROGRESS NOTES
Assessment/Plan:    No problem-specific Assessment & Plan notes found for this encounter  Diagnoses and all orders for this visit:    Medicare annual wellness visit, subsequent    Colon cancer screening    Screening for cardiovascular, respiratory, and genitourinary diseases  -     Lipid Panel with Direct LDL reflex; Future    Screening for diabetes mellitus (DM)  -     Comprehensive metabolic panel; Future    Prostate cancer screening  -     PSA, Total Screen; Future    Memory difficulties  -     TSH, 3rd generation; Future  -     CBC and differential; Future  -     Vitamin D 25 hydroxy; Future  -     Vitamin B12; Future  -     CT head w contrast; Future  -     Ambulatory referral to Neurology; Future    Myositis, unspecified myositis type, unspecified site  -     Vitamin D 25 hydroxy; Future              Return in about 1 month (around 2/18/2023) for Recheck  Subjective:      Patient ID: Renetta Russell is a 67 y o  male  Chief Complaint   Patient presents with   • New Patient Visit     Patients brother states patient is having some confusion  Talks to himself  Loss for words  Angry sometimes(unusual for him), hes usually funny  Obsession with washing dishes  Forgot how to turn on oven  Not eating well and has some recent weight loss  Seems lost      • Establish Care     Patient having some forgetfulness   Anderson Rangel MA     • Medicare Wellness Visit     New patient here to establish care  Anderson Rangel MA         HPI    The following portions of the patient's history were reviewed and updated as appropriate: allergies, current medications, past family history, past medical history, past social history, past surgical history and problem list     Review of Systems      Current Outpatient Medications   Medication Sig Dispense Refill   • VITAMIN D PO Take 1,000 mg by mouth       No current facility-administered medications for this visit         Objective:    /70   Pulse 61   Temp 98 °F (36 7 °C) Resp 16   Ht 5' 8 25" (1 734 m)   Wt 65 3 kg (144 lb)   SpO2 97%   BMI 21 74 kg/m²        Physical Exam           Orestes Turpin,

## 2023-01-19 NOTE — PATIENT INSTRUCTIONS
Medicare Preventive Visit Patient Instructions  Thank you for completing your Welcome to Medicare Visit or Medicare Annual Wellness Visit today  Your next wellness visit will be due in one year (1/19/2024)  The screening/preventive services that you may require over the next 5-10 years are detailed below  Some tests may not apply to you based off risk factors and/or age  Screening tests ordered at today's visit but not completed yet may show as past due  Also, please note that scanned in results may not display below  Preventive Screenings:  Service Recommendations Previous Testing/Comments   Colorectal Cancer Screening  · Colonoscopy    · Fecal Occult Blood Test (FOBT)/Fecal Immunochemical Test (FIT)  · Fecal DNA/Cologuard Test  · Flexible Sigmoidoscopy Age: 39-70 years old   Colonoscopy: every 10 years (May be performed more frequently if at higher risk)  OR  FOBT/FIT: every 1 year  OR  Cologuard: every 3 years  OR  Sigmoidoscopy: every 5 years  Screening may be recommended earlier than age 39 if at higher risk for colorectal cancer  Also, an individualized decision between you and your healthcare provider will decide whether screening between the ages of 74-80 would be appropriate   Colonoscopy: Not on file  FOBT/FIT: Not on file  Cologuard: Not on file  Sigmoidoscopy: Not on file          Prostate Cancer Screening Individualized decision between patient and health care provider in men between ages of 53-78   Medicare will cover every 12 months beginning on the day after your 50th birthday PSA: No results in last 5 years           Hepatitis C Screening Once for adults born between [facility]  More frequently in patients at high risk for Hepatitis C Hep C Antibody: Not on file        Diabetes Screening 1-2 times per year if you're at risk for diabetes or have pre-diabetes Fasting glucose: No results in last 5 years (No results in last 5 years)  A1C: No results in last 5 years (No results in last 5 years) Cholesterol Screening Once every 5 years if you don't have a lipid disorder  May order more often based on risk factors  Lipid panel: Not on file         Other Preventive Screenings Covered by Medicare:  1  Abdominal Aortic Aneurysm (AAA) Screening: covered once if your at risk  You're considered to be at risk if you have a family history of AAA or a male between the age of 73-68 who smoking at least 100 cigarettes in your lifetime  2  Lung Cancer Screening: covers low dose CT scan once per year if you meet all of the following conditions: (1) Age 50-69; (2) No signs or symptoms of lung cancer; (3) Current smoker or have quit smoking within the last 15 years; (4) You have a tobacco smoking history of at least 20 pack years (packs per day x number of years you smoked); (5) You get a written order from a healthcare provider  3  Glaucoma Screening: covered annually if you're considered high risk: (1) You have diabetes OR (2) Family history of glaucoma OR (3)  aged 48 and older OR (3)  American aged 72 and older  3  Osteoporosis Screening: covered every 2 years if you meet one of the following conditions: (1) Have a vertebral abnormality; (2) On glucocorticoid therapy for more than 3 months; (3) Have primary hyperparathyroidism; (4) On osteoporosis medications and need to assess response to drug therapy  5  HIV Screening: covered annually if you're between the age of 12-76  Also covered annually if you are younger than 13 and older than 72 with risk factors for HIV infection  For pregnant patients, it is covered up to 3 times per pregnancy      Immunizations:  Immunization Recommendations   Influenza Vaccine Annual influenza vaccination during flu season is recommended for all persons aged >= 6 months who do not have contraindications   Pneumococcal Vaccine   * Pneumococcal conjugate vaccine = PCV13 (Prevnar 13), PCV15 (Vaxneuvance), PCV20 (Prevnar 20)  * Pneumococcal polysaccharide vaccine = PPSV23 (Pneumovax) Adults 2364 years old: 1-3 doses may be recommended based on certain risk factors  Adults 72 years old: 1-2 doses may be recommended based off what pneumonia vaccine you previously received   Hepatitis B Vaccine 3 dose series if at intermediate or high risk (ex: diabetes, end stage renal disease, liver disease)   Tetanus (Td) Vaccine - COST NOT COVERED BY MEDICARE PART B Following completion of primary series, a booster dose should be given every 10 years to maintain immunity against tetanus  Td may also be given as tetanus wound prophylaxis  Tdap Vaccine - COST NOT COVERED BY MEDICARE PART B Recommended at least once for all adults  For pregnant patients, recommended with each pregnancy  Shingles Vaccine (Shingrix) - COST NOT COVERED BY MEDICARE PART B  2 shot series recommended in those aged 48 and above     Health Maintenance Due:      Topic Date Due   • Hepatitis C Screening  Never done   • Colorectal Cancer Screening  Never done     Immunizations Due:      Topic Date Due   • COVID-19 Vaccine (1) Never done   • Pneumococcal Vaccine: 65+ Years (1 - PCV) Never done     Advance Directives   What are advance directives? Advance directives are legal documents that state your wishes and plans for medical care  These plans are made ahead of time in case you lose your ability to make decisions for yourself  Advance directives can apply to any medical decision, such as the treatments you want, and if you want to donate organs  What are the types of advance directives? There are many types of advance directives, and each state has rules about how to use them  You may choose a combination of any of the following:  · Living will: This is a written record of the treatment you want  You can also choose which treatments you do not want, which to limit, and which to stop at a certain time  This includes surgery, medicine, IV fluid, and tube feedings     · Durable power of  for healthcare [facility]): This is a written record that states who you want to make healthcare choices for you when you are unable to make them for yourself  This person, called a proxy, is usually a family member or a friend  You may choose more than 1 proxy  · Do not resuscitate (DNR) order:  A DNR order is used in case your heart stops beating or you stop breathing  It is a request not to have certain forms of treatment, such as CPR  A DNR order may be included in other types of advance directives  · Medical directive: This covers the care that you want if you are in a coma, near death, or unable to make decisions for yourself  You can list the treatments you want for each condition  Treatment may include pain medicine, surgery, blood transfusions, dialysis, IV or tube feedings, and a ventilator (breathing machine)  · Values history: This document has questions about your views, beliefs, and how you feel and think about life  This information can help others choose the care that you would choose  Why are advance directives important? An advance directive helps you control your care  Although spoken wishes may be used, it is better to have your wishes written down  Spoken wishes can be misunderstood, or not followed  Treatments may be given even if you do not want them  An advance directive may make it easier for your family to make difficult choices about your care  © Copyright [employer] 2018 Information is for End User's use only and may not be sold, redistributed or otherwise used for commercial purposes   All illustrations and images included in CareNotes® are the copyrighted property of [employer]  or [address] [place] [address] [employer]

## 2023-01-20 ENCOUNTER — APPOINTMENT (OUTPATIENT)
Dept: LAB | Facility: HOSPITAL | Age: 73
End: 2023-01-20

## 2023-01-20 DIAGNOSIS — Z13.83 SCREENING FOR CARDIOVASCULAR, RESPIRATORY, AND GENITOURINARY DISEASES: ICD-10-CM

## 2023-01-20 DIAGNOSIS — Z12.5 PROSTATE CANCER SCREENING: ICD-10-CM

## 2023-01-20 DIAGNOSIS — Z13.1 SCREENING FOR DIABETES MELLITUS (DM): ICD-10-CM

## 2023-01-20 DIAGNOSIS — Z13.6 SCREENING FOR CARDIOVASCULAR, RESPIRATORY, AND GENITOURINARY DISEASES: ICD-10-CM

## 2023-01-20 DIAGNOSIS — Z13.89 SCREENING FOR CARDIOVASCULAR, RESPIRATORY, AND GENITOURINARY DISEASES: ICD-10-CM

## 2023-01-20 DIAGNOSIS — M60.9 MYOSITIS, UNSPECIFIED MYOSITIS TYPE, UNSPECIFIED SITE: ICD-10-CM

## 2023-01-20 DIAGNOSIS — R41.3 MEMORY DIFFICULTIES: ICD-10-CM

## 2023-01-20 LAB
25(OH)D3 SERPL-MCNC: 39 NG/ML (ref 30–100)
ALBUMIN SERPL BCP-MCNC: 3.9 G/DL (ref 3.5–5)
ALP SERPL-CCNC: 56 U/L (ref 46–116)
ALT SERPL W P-5'-P-CCNC: 22 U/L (ref 12–78)
ANION GAP SERPL CALCULATED.3IONS-SCNC: 4 MMOL/L (ref 4–13)
AST SERPL W P-5'-P-CCNC: 17 U/L (ref 5–45)
BASOPHILS # BLD AUTO: 0.04 THOUSANDS/ÂΜL (ref 0–0.1)
BASOPHILS NFR BLD AUTO: 1 % (ref 0–1)
BILIRUB SERPL-MCNC: 0.53 MG/DL (ref 0.2–1)
BUN SERPL-MCNC: 18 MG/DL (ref 5–25)
CALCIUM SERPL-MCNC: 8.8 MG/DL (ref 8.3–10.1)
CHLORIDE SERPL-SCNC: 106 MMOL/L (ref 96–108)
CHOLEST SERPL-MCNC: 159 MG/DL
CO2 SERPL-SCNC: 32 MMOL/L (ref 21–32)
CREAT SERPL-MCNC: 0.99 MG/DL (ref 0.6–1.3)
EOSINOPHIL # BLD AUTO: 0.04 THOUSAND/ÂΜL (ref 0–0.61)
EOSINOPHIL NFR BLD AUTO: 1 % (ref 0–6)
ERYTHROCYTE [DISTWIDTH] IN BLOOD BY AUTOMATED COUNT: 12.1 % (ref 11.6–15.1)
GFR SERPL CREATININE-BSD FRML MDRD: 75 ML/MIN/1.73SQ M
GLUCOSE P FAST SERPL-MCNC: 99 MG/DL (ref 65–99)
HCT VFR BLD AUTO: 43.1 % (ref 36.5–49.3)
HDLC SERPL-MCNC: 64 MG/DL
HGB BLD-MCNC: 14.6 G/DL (ref 12–17)
IMM GRANULOCYTES # BLD AUTO: 0.01 THOUSAND/UL (ref 0–0.2)
IMM GRANULOCYTES NFR BLD AUTO: 0 % (ref 0–2)
LDLC SERPL CALC-MCNC: 87 MG/DL (ref 0–100)
LYMPHOCYTES # BLD AUTO: 1.12 THOUSANDS/ÂΜL (ref 0.6–4.47)
LYMPHOCYTES NFR BLD AUTO: 23 % (ref 14–44)
MCH RBC QN AUTO: 31.5 PG (ref 26.8–34.3)
MCHC RBC AUTO-ENTMCNC: 33.9 G/DL (ref 31.4–37.4)
MCV RBC AUTO: 93 FL (ref 82–98)
MONOCYTES # BLD AUTO: 0.27 THOUSAND/ÂΜL (ref 0.17–1.22)
MONOCYTES NFR BLD AUTO: 6 % (ref 4–12)
NEUTROPHILS # BLD AUTO: 3.37 THOUSANDS/ÂΜL (ref 1.85–7.62)
NEUTS SEG NFR BLD AUTO: 69 % (ref 43–75)
NRBC BLD AUTO-RTO: 0 /100 WBCS
PLATELET # BLD AUTO: 161 THOUSANDS/UL (ref 149–390)
PMV BLD AUTO: 9.5 FL (ref 8.9–12.7)
POTASSIUM SERPL-SCNC: 4.1 MMOL/L (ref 3.5–5.3)
PROT SERPL-MCNC: 6.9 G/DL (ref 6.4–8.4)
PSA SERPL-MCNC: 0.9 NG/ML (ref 0–4)
RBC # BLD AUTO: 4.64 MILLION/UL (ref 3.88–5.62)
SODIUM SERPL-SCNC: 142 MMOL/L (ref 135–147)
TRIGL SERPL-MCNC: 40 MG/DL
TSH SERPL DL<=0.05 MIU/L-ACNC: 1.63 UIU/ML (ref 0.45–4.5)
VIT B12 SERPL-MCNC: 342 PG/ML (ref 100–900)
WBC # BLD AUTO: 4.85 THOUSAND/UL (ref 4.31–10.16)

## 2023-01-23 ENCOUNTER — TELEPHONE (OUTPATIENT)
Dept: NEUROLOGY | Facility: CLINIC | Age: 73
End: 2023-01-23

## 2023-01-23 NOTE — TELEPHONE ENCOUNTER
Patient calling to schedule new patient appointment for memory issues  No testing done  Triage intake sent

## 2023-01-25 ENCOUNTER — HOSPITAL ENCOUNTER (OUTPATIENT)
Dept: RADIOLOGY | Facility: HOSPITAL | Age: 73
Discharge: HOME | End: 2023-01-25
Attending: FAMILY MEDICINE

## 2023-01-25 DIAGNOSIS — R41.3 MEMORY DIFFICULTIES: ICD-10-CM

## 2023-01-25 RX ADMIN — IOHEXOL 85 ML: 350 INJECTION, SOLUTION INTRAVENOUS at 08:41

## 2023-03-15 PROBLEM — Z12.5 PROSTATE CANCER SCREENING: Status: RESOLVED | Noted: 2023-01-14 | Resolved: 2023-03-15

## 2023-03-15 PROBLEM — Z13.1 SCREENING FOR DIABETES MELLITUS (DM): Status: RESOLVED | Noted: 2023-01-14 | Resolved: 2023-03-15

## 2023-03-15 PROBLEM — Z13.89 SCREENING FOR CARDIOVASCULAR, RESPIRATORY, AND GENITOURINARY DISEASES: Status: RESOLVED | Noted: 2023-01-14 | Resolved: 2023-03-15

## 2023-03-15 PROBLEM — Z13.83 SCREENING FOR CARDIOVASCULAR, RESPIRATORY, AND GENITOURINARY DISEASES: Status: RESOLVED | Noted: 2023-01-14 | Resolved: 2023-03-15

## 2023-03-15 PROBLEM — Z13.6 SCREENING FOR CARDIOVASCULAR, RESPIRATORY, AND GENITOURINARY DISEASES: Status: RESOLVED | Noted: 2023-01-14 | Resolved: 2023-03-15

## 2023-03-15 PROBLEM — Z12.11 COLON CANCER SCREENING: Status: RESOLVED | Noted: 2023-01-14 | Resolved: 2023-03-15

## 2023-03-15 PROBLEM — Z00.00 MEDICARE ANNUAL WELLNESS VISIT, SUBSEQUENT: Status: RESOLVED | Noted: 2023-01-14 | Resolved: 2023-03-15

## 2023-06-06 ENCOUNTER — OFFICE VISIT (OUTPATIENT)
Dept: AUDIOLOGY | Facility: CLINIC | Age: 73
End: 2023-06-06
Payer: MEDICARE

## 2023-06-06 ENCOUNTER — TELEPHONE (OUTPATIENT)
Dept: NEUROLOGY | Facility: CLINIC | Age: 73
End: 2023-06-06

## 2023-06-06 DIAGNOSIS — H90.3 SENSORINEURAL HEARING LOSS (SNHL), BILATERAL: Primary | ICD-10-CM

## 2023-06-06 PROCEDURE — 92567 TYMPANOMETRY: CPT | Performed by: AUDIOLOGIST

## 2023-06-06 PROCEDURE — 92557 COMPREHENSIVE HEARING TEST: CPT | Performed by: AUDIOLOGIST

## 2023-06-06 NOTE — PROGRESS NOTES
ADULT HEARING EVALUATION - [place] [address] AUDIOLOGY      Patient Name: Tameka Hobson   MRN:  [id number]   :  1950   Age: 67 y o  Gender: male   DOS: 2023     HISTORY:     Tameka Hobson, a 67 y o  male, was seen on 2023 at the referral of Dr Filomena Kwok for an audiometric evaluation  Mr Evelia Seth was accompanied by his wife at today's visit  Mr Evelia Seth is a new patient to our practice  Today, Mr oJhn De primary complaint(s) was difficulty hearing that started after his wife was exp[eriencing significant health issues  His wife reports that he is also having memory difficulties and has an appointment to see a neurologist  Mr Evelia Seth denied otalgia, otorrhea, aural fullness, tinnitus and dizziness  History of otitis was negative  Mr Evelia Seth has no history of ear surgeries  Family history of hearing loss was positive, with his uncles having been diagnosed with acquired hearing loss over time  Communication difficulties include one on one and group conversations   History of recreational noise exposure is positive  Other documented medical history states that Mr Evelia eSth  has no past medical history on file  Mr Evelia Seth has not had his hearing tested previously  He has a neurology consult pending for concerns for memory loss       RESULTS:    Otoscopic Evaluation:   Right Ear: Unremarkable, canal clear   Left Ear: Unremarkable, canal clear    Tympanometry:   Right Ear: Type A; normal middle ear pressure and static compliance    Left Ear: Type A; normal middle ear pressure and static compliance     Audiometry:  Conventional pure tone audiometry from 250 - 8000 Hz  obtained with good reliability and revealed the following:     Right Ear: mild to moderately-severe sensorineural hearing loss (SNHL)   Left Ear: normal to moderately-severe sensorineural hearing loss (SNHL)       Speech Audiometry:    Speech Reception (SRT)   Right Ear: 30 dB HL   Left Ear: 30 dB HL    Word Recognition Scores (WRS):  Right Ear: excellent (90 % correct) Left Ear: excellent (90 % correct)   Stimuli: [id number]    IMPRESSIONS:  Bilateral SNHL, normal to mod-severe AS, mild to mod-severe AD    The results of today's findings were reviewed with Mr Stephanie Aguirre and his hearing thresholds were explained at length  Treatment options, including amplification and communication strategies, were discussed as appropriate  Mr Stephanie Aguirre voiced understanding of his test results and had no further questions  RECOMMENDATIONS:    1 ) Follow-up with referring provider  2 ) Based on today's findings and patient symptoms, Mr Stephanie Aguirre is a candidate for a trial with amplification  A hearing aid evaluation to further discuss Mr Selina Moffett lifestyle, communication needs, and develop a treatment plan for their hearing loss is recommended  3 ) Annual audiograms, sooner if problems/concerns arise  *see attached audiogram*    It was a pleasure working with Mr Stephanie Aguirre today  Thank you for referring this patient  Glenn Encarnacion    Clinical Audiologist    [address] [address] [id number]

## 2023-06-21 ENCOUNTER — CONSULT (OUTPATIENT)
Dept: NEUROLOGY | Facility: CLINIC | Age: 73
End: 2023-06-21
Payer: MEDICARE

## 2023-06-21 VITALS
BODY MASS INDEX: 22.16 KG/M2 | OXYGEN SATURATION: 97 % | TEMPERATURE: 97 F | SYSTOLIC BLOOD PRESSURE: 114 MMHG | DIASTOLIC BLOOD PRESSURE: 75 MMHG | WEIGHT: 146.2 LBS | HEIGHT: 68 IN | HEART RATE: 59 BPM

## 2023-06-21 DIAGNOSIS — R41.3 MEMORY DIFFICULTIES: ICD-10-CM

## 2023-06-21 PROCEDURE — 99205 OFFICE O/P NEW HI 60 MIN: CPT | Performed by: PSYCHIATRY & NEUROLOGY

## 2023-06-21 NOTE — PROGRESS NOTES
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  It seems that patient is affected by not working anymore  He was the kind of person who had schedule every day  He helps out at home but is not the same  He's having hard time remembering things  He can't always complete sentences  He can forget his grand kids' names  He still drives  If it's familiar to him, he does fine  His vit B12, TSH, Vit D were all wnl  History reviewed  No pertinent past medical history  Current Outpatient Medications on File Prior to Visit   Medication Sig Dispense Refill   • VITAMIN D PO Take 1,000 mg by mouth (Patient not taking: Reported on 6/21/2023)       No current facility-administered medications on file prior to visit  No Known Allergies      Family History   Problem Relation Age of Onset   • No Known Problems Mother    • No Known Problems Father                 History reviewed  No pertinent surgical history          Social History     Socioeconomic History   • Marital status: /Civil Union     Spouse name: Not on file   • Number of children: Not on file   • Years of education: Not on file   • Highest education level: Not on file   Occupational History   • Not on file   Tobacco Use   • Smoking status: Former     Packs/day: 1 00     Types: Cigarettes     Start date: 6/4/1952     Quit date: 2/14/1989     Years since "quittin 3   • Smokeless tobacco: Never   Vaping Use   • Vaping Use: Never used   Substance and Sexual Activity   • Alcohol use: Yes     Comment: socially   • Drug use: Never   • Sexual activity: Not on file   Other Topics Concern   • Not on file   Social History Narrative   • Not on file     Social Determinants of Health     Financial Resource Strain: Low Risk  (2023)    Overall Financial Resource Strain (CARDIA)    • Difficulty of Paying Living Expenses: Not hard at all   Food Insecurity: Not on file   Transportation Needs: No Transportation Needs (2023)    PRAPARE - Transportation    • Lack of Transportation (Medical): No    • Lack of Transportation (Non-Medical): No   Physical Activity: Not on file   Stress: Not on file   Social Connections: Not on file   Intimate Partner Violence: Not on file   Housing Stability: Not on file       Review of Systems  Refer to positive review of systems in HPI  Constitutional- No fever  Eyes- No visual change  ENT- Hearing normal  CV- No chest pain  Resp- No Shortness of breath  GI- No diarrhea  - Bladder normal  MS- No Arthritis   Skin- No rash  Psych- No depression  Endo- No DM  Heme- No nodes    PHYSICAL EXAM:    Vitals:    23 1455   BP: 114/75   BP Location: Left arm   Patient Position: Sitting   Cuff Size: Standard   Pulse: 59   Temp: (!) 97 °F (36 1 °C)   TempSrc: Tympanic   SpO2: 97%   Weight: 66 3 kg (146 lb 3 2 oz)   Height: 5' 8 25\" (1 734 m)         Appearance: No Acute Distress  Ophthalmoscopic: Disc Flat, Normal fundus  Carotid/Heart/Peripheral Vascular: No Bruits, RRR  Orientation: Awake, Alert, and Oriented to day but not month or year     Mental status:  Memory: MOCA: 1630  Attention: Normal  Knowledge: Appropriate  Language: No aphasia  Speech: No dysarthria  Cranial Nerves:  2 No Visual Defect on Confrontation; Pupils round, equal, reactive to light  3,4,6 Extraocular Movements Intact; no nystagmus  5 Facial Sensation Intact  7 No facial " asymmetry  8 Intact hearing  9,10 Palate symmetric, normal gag  11 Good shoulder shrug  12 Tongue Midline  Gait: Stable, No ataxia, can perform tandem walking  Coordination: No ataxia with finger to nose testing and heel to shin testing  Sensory: Intact, Symmetric to Pinprick, Light Touch, Vibration, and Joint Position  Muscle Tone: Normal  Muscle exam  Arm Right Left Leg Right Left   Deltoid 5/5 5/5 Iliopsoas 5/5 5/5   Biceps 5/5 5/5 Quads 5/5 5/5   Triceps 5/5 5/5 Hamstrings 5/5 5/5   Wrist Extension 5/5 5/5 Ankle Dorsi Flexion 5/5 5/5   Wrist Flexion 5/5 5/5 Ankle Plantar Flexion 5/5 5/5   Interossei 5/5 5/5 Ankle Eversion 5/5 5/5   APB 5/5 5/5 Ankle Inversion 5/5 5/5       Reflexes   RJ BJ TJ KJ AJ Plantars Rhodes's   Right 2+ 2+ 2+ 2+  Downgoing Not present   Left 2+ 2+ 2+ 2+  Downgoing Not present         Personal review of          Ct brain:   Normal     Assessment/Plan:     1  Memory difficulties  Ambulatory referral to Neurology    MRI brain NeuroQuant wo and w contrast    EEG Awake and asleep    RPR-Syphilis Screening (Total Syphilis IGG/IGM)    Methylmalonic acid, serum    Vitamin B12    Ambulatory referral to Neuropsychology          Patient is exhibiting signs of relatively moderate memory impairment however it's not clear whether there is underlying psychological problem  Will obtain MRI brain neuroquant  Will obtain EEG  Will check for some reversible factors  Will refer him for neuropsych testing  Counseling Documentation:  The patient and/or patient's family were  counseled regarding diagnostic results  Instructions for management,risk factor reductions,prognosis of disease were discussed  Patient and family were educated regarding impressions,risks and benefits of treatment options,importance of compliance with treatment  Total time of encounter: 60 min   More than 50% of time was spent in counseling and coordination of care of patient  HERMELINDA Irby    [facility]  [address]  Miguel Beal [address]

## 2023-06-29 ENCOUNTER — TELEPHONE (OUTPATIENT)
Dept: PSYCHIATRY | Facility: CLINIC | Age: 73
End: 2023-06-29

## 2023-06-29 NOTE — TELEPHONE ENCOUNTER
Walter Singh and/or Daughter Craig Barragan requested a call back to discuss referral for Neuro for patient for Memory Difficulties and would like to schedule an appointment either in [address] if possible  They can be reached at P# [phone]      Thank you

## 2023-07-03 ENCOUNTER — TELEPHONE (OUTPATIENT)
Dept: PSYCHIATRY | Facility: CLINIC | Age: 73
End: 2023-07-03

## 2023-07-05 NOTE — TELEPHONE ENCOUNTER
Sivakumar Gill and/or Pts Step daughter Ragini Mack  requested a call back to discuss scheduling him. She requests you call her and not him. They can be reached at P# [phone]       Thank you.

## 2023-07-10 NOTE — TELEPHONE ENCOUNTER
Patient daughter called in regards to Neuro referral and scheduling. She stated patient is very confused and she requests Neuro call her to set up an appt. She stated she cannot answer her cell phone during the day and requests a phone call on her work phone. She stated a message can be left. Work number below. She stated to ask for [facility].     [phone]

## 2023-07-11 ENCOUNTER — TELEPHONE (OUTPATIENT)
Dept: NEUROLOGY | Facility: CLINIC | Age: 73
End: 2023-07-11

## 2023-07-11 ENCOUNTER — APPOINTMENT (OUTPATIENT)
Dept: LAB | Facility: HOSPITAL | Age: 73
End: 2023-07-11
Payer: MEDICARE

## 2023-07-11 DIAGNOSIS — R41.3 MEMORY DIFFICULTIES: ICD-10-CM

## 2023-07-11 LAB
TREPONEMA PALLIDUM IGG+IGM AB [PRESENCE] IN SERUM OR PLASMA BY IMMUNOASSAY: NORMAL
VIT B12 SERPL-MCNC: 233 PG/ML (ref 180–914)

## 2023-07-11 PROCEDURE — 82607 VITAMIN B-12: CPT

## 2023-07-11 PROCEDURE — 36415 COLL VENOUS BLD VENIPUNCTURE: CPT

## 2023-07-11 PROCEDURE — 86780 TREPONEMA PALLIDUM: CPT

## 2023-07-11 PROCEDURE — 83918 ORGANIC ACIDS TOTAL QUANT: CPT

## 2023-07-11 NOTE — TELEPHONE ENCOUNTER
Spoke to the patients wife and informed her of the BW results and the provider directives.    ----- Message from Flavio Dang MD sent at 7/11/2023  3:50 PM EDT -----  Please call the patient regarding his abnormal result. His Vit b12 is slightly low for the number we prefer. Ask him to take B12 1000mcg supplement one a day.

## 2023-07-12 ENCOUNTER — HOSPITAL ENCOUNTER (OUTPATIENT)
Dept: NEUROLOGY | Facility: HOSPITAL | Age: 73
Discharge: HOME | End: 2023-07-12
Attending: PSYCHIATRY & NEUROLOGY
Payer: MEDICARE

## 2023-07-12 DIAGNOSIS — R41.3 MEMORY DIFFICULTIES: ICD-10-CM

## 2023-07-12 PROCEDURE — 95819 EEG AWAKE AND ASLEEP: CPT

## 2023-07-13 PROCEDURE — 95812 EEG 41-60 MINUTES: CPT | Performed by: PSYCHIATRY & NEUROLOGY

## 2023-07-14 LAB — METHYLMALONATE SERPL-SCNC: 346 NMOL/L (ref 0–378)

## 2023-08-11 ENCOUNTER — TELEPHONE (OUTPATIENT)
Dept: NEUROLOGY | Facility: CLINIC | Age: 73
End: 2023-08-11

## 2023-08-11 DIAGNOSIS — E53.8 VITAMIN B12 DEFICIENCY: Primary | ICD-10-CM

## 2023-08-11 RX ORDER — LANOLIN ALCOHOL/MO/W.PET/CERES
1000 CREAM (GRAM) TOPICAL DAILY
Qty: 30 TABLET | Refills: 5 | Status: SHIPPED | OUTPATIENT
Start: 2023-08-11

## 2023-08-11 NOTE — PROGRESS NOTES
Pt's Vitamin B12 was low end of normal, recommended by attending Neurology MD to initiate on Vit B12 1000mcg daily. Family contacted office, requested script be sent for "new medication". Script sent.

## 2023-08-11 NOTE — TELEPHONE ENCOUNTER
Pt's DIL left a VM re: family medical history and medication for memory. Transcribed VM:   Hi, this is [place] and I am calling regarding Natalie Murrieta. His YOB: 1950. This is his daughter, and I just wanted to find out, I know that they mentioned giving him some type of medication or pill for memory. And was wondering if we could get that prescription filled. Also, I did find out some information that would be useful. His father passed away at 46years old from a brain tumor. So I also wanted you to notate that. I'm his step daughter, so I wasn't aware of exactly what he passed from. So it was a brain tumor when he was 45. So just wanted to give that information and if anybody needs to call me back, it's [phone]. Thank you. Laure. Called back and left a VM with a call back #. No mention of med other than B-12 in Dr. Madisyn Freeman consult note (6/21/23).

## 2023-08-14 ENCOUNTER — HOSPITAL ENCOUNTER (OUTPATIENT)
Dept: RADIOLOGY | Facility: HOSPITAL | Age: 73
Discharge: HOME | End: 2023-08-14
Attending: PSYCHIATRY & NEUROLOGY
Payer: MEDICARE

## 2023-08-14 DIAGNOSIS — R41.3 MEMORY DIFFICULTIES: ICD-10-CM

## 2023-08-14 PROCEDURE — G1004 CDSM NDSC: HCPCS

## 2023-08-14 PROCEDURE — A9585 GADOBUTROL INJECTION: HCPCS | Performed by: PSYCHIATRY & NEUROLOGY

## 2023-08-14 PROCEDURE — 70553 MRI BRAIN STEM W/O & W/DYE: CPT

## 2023-08-14 RX ORDER — GADOBUTROL 604.72 MG/ML
6 INJECTION INTRAVENOUS
Status: COMPLETED | OUTPATIENT
Start: 2023-08-14 | End: 2023-08-14

## 2023-08-14 RX ADMIN — GADOBUTROL 6 ML: 604.72 INJECTION INTRAVENOUS at 12:28

## 2023-08-14 NOTE — TELEPHONE ENCOUNTER
Hi, this is Quiana Rain and I received a call on Friday from either CJ or ANDRES. I couldn't quite get it. I'm calling in reference to PHYLLIS CURRIE [facility]. His YOB: 1950. He is already taking the B12. The doctor [place] mentioned something, some type of prescription for memory loss. She didn't say what? She said that if we wanted something like that that we could just give the office a call. So she did not say anything specifically, but I'm sure she had something in mind. If you can call me back. That would be great. [phone]. And this is Candance Kos. Thank you. Laure. Chart reviewed  There is communication consent on file dated 2/8/21. Wife Major Vaz is the only one listed on the consent form.      Called [phone] and left a message on pt's answering machine for a call back

## 2023-08-14 NOTE — TELEPHONE ENCOUNTER
RENATA Gross  to Chucho Sanderson, RN • Neurology [facility] SPECIALTY [facility] Clinical Team 5        8/11/23  5:19 PM  I am wondering if they discusses donepezil or memantine during his last evaluation with Dr. Camilla Pineda, if so she did not document.     The only new med recommended was Vitamin B12, I sent a script to their pharmacy. If not covered, they can  over the counter.      Thanks   [employer]

## 2023-08-15 DIAGNOSIS — R41.3 MEMORY DIFFICULTIES: Primary | ICD-10-CM

## 2023-08-15 RX ORDER — DONEPEZIL HYDROCHLORIDE 10 MG/1
10 TABLET, FILM COATED ORAL
Qty: 30 TABLET | Refills: 4 | Status: SHIPPED | OUTPATIENT
Start: 2023-08-15

## 2023-08-15 NOTE — TELEPHONE ENCOUNTER
I'm calling for. Someone called me yesterday for Alivia. . I'm just returning the call. I'd like to know if I could get some type of prescription for his memory loss. So I'd expect a call back. Thank you so much bye. Cb [phone]    Called [phone], spoke to pt's wife Coreen Funes and advised of all of the below and above. She verbalized understanding. States that she is aware that her dtr called. States that Dr Sarah Kang did not mention specific memory med. Dr Sarah Kang just told them that she will be prescribing med for pt's memory. She is agreeable to try either memantine or donepezil.  Requesting script be sent to [address] [place] [address] on file

## 2023-08-15 NOTE — PROGRESS NOTES
Pt has been seen on consult by attending Neurologist, Dr. Kirti Shea and has had labs and MRI ordered re: memory loss and changes. Labs have all returned in normal ranges and MRI of the Brain has been completed, although read is pending at this time. Independent read of this MRI did not find any acute ischemic issues on inspection, awaiting final read by Radiologist re: his NeuroQuant or post contrast image findings. Pt has had MOCA at last office eval and scored 21/30. Pt family and wife has contacted the neurology office, per the pt's wife, Dr. Kirti Shea had a discussion with them re: adding memory medication however per wife, did not specify which medication to be used. Pt has no documented history of cardiac, gi or respiratory disease. Therefore it is not unreasonable to start pt on donepezil 10mg at bedtime daily and continue to follow up with Dr. Kirti Shea at next office evaluation. Pt continues with outstanding appointment/referral for neuropsych for additional recommendations.

## 2023-08-16 NOTE — TELEPHONE ENCOUNTER
Called [phone] and left a message on the answering machine for a call back    [address]  to Me        8/15/23  4:54 PM  I reviewed the pt's chart, it does not seem unreasonable to start pt on donepezil. Navarro King has no documented cardiac, gi or respiratory issues so I started him on 10mg daily at bedtime. Common side effects can be GI symptoms including diarrhea, so have the wife contact us if he should have any issues with this addition. I did look at the MRI, it is completed but not read by the radiologist yet. Neeru Forrester will update them when we have the final reading. Also, she was given a referral for NeuroPsych evaluation. Lizette Sena they gotten a chance to call and schedule?  It may be helpful in assisting with recommendations for treatment as well.      Thanks   [employer]

## 2023-08-18 NOTE — TELEPHONE ENCOUNTER
Pt's wife made aware of below. States that pt started donepezil last night and tolerating so far.     She states that pt did neuropsych test Wednesday 8/16,  She is unsure who he saw but it was in [place]  I do not see that he was seen by  neuropsych  She will have pt's daughter call back with the name of who pt saw for neuropsych testing    MRI results now available, please advise  [phone] to leave detailed message

## 2023-08-23 ENCOUNTER — TELEPHONE (OUTPATIENT)
Dept: NEUROLOGY | Facility: CLINIC | Age: 73
End: 2023-08-23

## 2023-08-23 NOTE — TELEPHONE ENCOUNTER
August 23, 2023  Edgar Wiggins RN     [facility]    8/23/23  7:42 AM  Note      Recd 8/22/23 12:20pm  This is Quiana Rain. I know my mom just asked me if I can give you guys a call to let you know where we took him for his four hour test. We went to [facility] neuropsychology and they are located in [place], [place]. So if you have any other questions, please give me a call back. They did tell me that it would probably take about 2 weeks for them to complete their test results. And my number is [phone]. Thank you. Laure.  ___________________________________     Pt's stepdaughter calling to advise neuropsychology testing has been completed. Awaiting results.

## 2023-08-23 NOTE — TELEPHONE ENCOUNTER
Mc Ronquillo MD You 15 minutes ago (2:50 PM)     Mri brain only shows small vessel disease expected for age. No other concerning finding. Neuropsych test results are not available to us yet.

## 2023-08-23 NOTE — TELEPHONE ENCOUNTER
Recd 8/22/23 12:20pm  This is Todd Louder. I know my mom just asked me if I can give you guys a call to let you know where we took him for his four hour test. We went to [facility]) neuropsychology and they are located in [place], [place]. So if you have any other questions, please give me a call back. They did tell me that it would probably take about 2 weeks for them to complete their test results. And my number is [phone]. Thank you. Laure.  ___________________________________    Pt's stepdaughter calling to advise neuropsychology testing has been completed. Awaiting results.

## 2023-08-28 ENCOUNTER — TELEPHONE (OUTPATIENT)
Dept: PSYCHIATRY | Facility: CLINIC | Age: 73
End: 2023-08-28

## 2023-11-16 ENCOUNTER — TELEPHONE (OUTPATIENT)
Dept: NEUROLOGY | Facility: CLINIC | Age: 73
End: 2023-11-16

## 2023-11-30 ENCOUNTER — OFFICE VISIT (OUTPATIENT)
Dept: NEUROLOGY | Facility: CLINIC | Age: 73
End: 2023-11-30
Payer: MEDICARE

## 2023-11-30 VITALS
HEIGHT: 68 IN | SYSTOLIC BLOOD PRESSURE: 122 MMHG | BODY MASS INDEX: 22.28 KG/M2 | HEART RATE: 53 BPM | TEMPERATURE: 97.3 F | WEIGHT: 147 LBS | OXYGEN SATURATION: 95 % | DIASTOLIC BLOOD PRESSURE: 68 MMHG

## 2023-11-30 DIAGNOSIS — F02.80 PRIMARY PROGRESSIVE APHASIA (HCC): Primary | ICD-10-CM

## 2023-11-30 DIAGNOSIS — G31.01 PRIMARY PROGRESSIVE APHASIA (HCC): Primary | ICD-10-CM

## 2023-11-30 DIAGNOSIS — G31.01 LOGOPENIC PROGRESSIVE APHASIA (HCC): ICD-10-CM

## 2023-11-30 DIAGNOSIS — F02.80 LOGOPENIC PROGRESSIVE APHASIA (HCC): ICD-10-CM

## 2023-11-30 PROCEDURE — 99215 OFFICE O/P EST HI 40 MIN: CPT | Performed by: PSYCHIATRY & NEUROLOGY

## 2023-11-30 RX ORDER — LATANOPROST 50 UG/ML
SOLUTION/ DROPS OPHTHALMIC
COMMUNITY
Start: 2023-11-01

## 2023-11-30 NOTE — PROGRESS NOTES
Return NeuroOutpatient Note        Viviana Grady  [id number]  75 y.o.  1950       Memory difficulties        History obtained from:  Patient and daughter     HPI/Subjective:    Viviana Grady is a 76 yo M with PMH of memory impairment presents as f/u. Family has noticed change in his memory for past 1.5 yrs. Once he looked for is[employer] for potato but didn't know how to use it. His language is fair if one on one. If surrounded by people then he would have hard time being vocal.   His wife has stage III lung cancer and stress of this has also contributed. He had scored 16/30 on MOCA. His mri brain neuroquant is essentially normal. His neuropsych testing had revealed pattern of PPA/logopenic progressive aphasia. His wife makes grocery list. He drives and gets what is needed. Daughter doesn't feel there's any problem with driving. His EEG had revealed background slowing but no seizure potentials. He only takes aricept 10mg daily. His vit B12, TSH, Vit D were all wnl. 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 He would only use typewriter and not try phone or computer. History reviewed. No pertinent past medical history.   Social History     Socioeconomic History   • Marital status: /Civil Union     Spouse name: Not on file   • Number of children: Not on file   • Years of education: Not on file   • Highest education level: Not on file   Occupational History   • Not on file   Tobacco Use   • Smoking status: Former     Packs/day: 1.00     Types: Cigarettes     Start date: 1952     Quit date: 1989     Years since quittin.8   • Smokeless tobacco: Never   Vaping Use   • Vaping Use: Never used   Substance and Sexual Activity   • Alcohol use: Yes     Comment: socially   • Drug use: Never   • Sexual activity: Not on file   Other Topics Concern   • Not on file   Social History Narrative   • Not on file     Social Determinants of Health     Financial Resource Strain: Low Risk  (2023) Overall Financial Resource Strain (CARDIA)    • Difficulty of Paying Living Expenses: Not hard at all   Food Insecurity: Not on file   Transportation Needs: No Transportation Needs (1/18/2023)    PRAPARE - Transportation    • Lack of Transportation (Medical): No    • Lack of Transportation (Non-Medical): No   Physical Activity: Not on file   Stress: Not on file   Social Connections: Not on file   Intimate Partner Violence: Not on file   Housing Stability: Not on file     Family History   Problem Relation Age of Onset   • No Known Problems Mother    • No Known Problems Father      No Known Allergies  Current Outpatient Medications on File Prior to Visit   Medication Sig Dispense Refill   • donepezil (Aricept) 10 mg tablet Take 1 tablet (10 mg total) by mouth daily at bedtime 30 tablet 4   • latanoprost (XALATAN) 0.005 % ophthalmic solution INSTILL 1 DROP IN BOTH EYES AT BEDTIME FOR INCREASED EYE PRESSURE PROTECT BOTTLE FROM LIGHT     • vitamin B-12 (VITAMIN B-12) 1,000 mcg tablet Take 1 tablet (1,000 mcg total) by mouth daily 30 tablet 5   • VITAMIN D PO Take 1,000 mg by mouth       No current facility-administered medications on file prior to visit. Review of Systems   Refer to positive review of systems in HPI.    Review of Systems    Constitutional- No fever  Eyes- No visual change  ENT- Hearing normal  CV- No chest pain  Resp- No Shortness of breath  GI- No diarrhea  - Bladder normal  MS- No Arthritis   Skin- No rash  Psych- No depression  Endo- No DM  Heme- No nodes    Vitals:    11/30/23 1002   BP: 122/68   BP Location: Left arm   Patient Position: Sitting   Cuff Size: Standard   Pulse: (!) 53   Temp: (!) 97.3 °F (36.3 °C)   TempSrc: Tympanic   SpO2: 95%   Weight: 66.7 kg (147 lb)   Height: 5' 8.25" (1.734 m)       PHYSICAL EXAM:  Appearance: No Acute Distress  Ophthalmoscopic: Disc Flat, Normal fundus  Mental status:  Orientation: Awake, Alert, and Oriented to person, couldn't tell me month, date or year, couldn't tell me his age. Memory: Registation 3/3 Recall 0/3  Couldn't do simple calculation. Attention: normal  Knowledge: fair   Language: No aphasia  Speech: word finding difficulty. Cranial Nerves:  2 No Visual Defect on Confrontation, Pupils round, equal, reactive to light  3,4,6 Extraocular Movements Intact, no nystagmus  5 Facial Sensation Intact  7 No facial asymmetry  8 Intact hearing  9,10 Palate symmetric, normal gag  11 Good shoulder shrug  12 Tongue Midline  Gait: Stable  Coordination: No ataxia with finger to nose testing, and heel to shin  Sensory: Intact, Symmetric to pinprick, light touch, vibration, and joint position  Muscle Tone: Normal              Muscle exam:  Arm Right Left Leg Right Left   Deltoid 5/5 5/5 Iliopsoas 5/5 5/5   Biceps 5/5 5/5 Quads 5/5 5/5   Triceps 5/5 5/5 Hamstrings 5/5 5/5   Wrist Extension 5/5 5/5 Ankle Dorsi Flexion 5/5 5/5   Wrist Flexion 5/5 5/5 Ankle Plantar Flexion 5/5 5/5   Interossei 5/5 5/5 Ankle Eversion 5/5 5/5   APB 5/5 5/5 Ankle Inversion 5/5 5/5       Reflexes   RJ BJ TJ KJ AJ Plantars Rhodes's   Right 2+ 2+ 2+ 2+  Downgoing Not present   Left 2+ 2+ 2+ 2+  Downgoing Not present     Personal review of  Labs:                  Diagnoses and all orders for this visit:      1. Primary progressive aphasia [facility])  Ambulatory Referral to Speech Therapy    Ambulatory Referral to Occupational Therapy      2. Logopenic progressive aphasia [facility])  Ambulatory Referral to Speech Therapy            Based on our today's encounter with him, we need him to try speech therapy/cognitive therapy. We need him to take fitness to drive test as well. We feel he definitely has moderate degree of dementia. At home, he's functional so it's not being captured as that at this time. He is to resume aricept 10mg daily. Encouraged cognitive exercises.              Total time of encounter:  45 min  More than 50% of the time was used in counseling and/or coordination of care  Extent of counseling and/or coordination of care        MD Amanda Linda [facility]  [address]  [phone]

## 2024-01-24 DIAGNOSIS — R41.3 MEMORY DIFFICULTIES: ICD-10-CM

## 2024-01-24 RX ORDER — DONEPEZIL HYDROCHLORIDE 10 MG/1
10 TABLET, FILM COATED ORAL
Qty: 30 TABLET | Refills: 4 | Status: SHIPPED | OUTPATIENT
Start: 2024-01-24

## 2024-01-25 NOTE — TELEPHONE ENCOUNTER
1/24/24, 0905    Favio [facility], [birth year]. I'm getting a prescription. It's donepezil. I request a refill. Thank you. Please call me back.  [phone]    Chart reviewed  Donepezil was sent to [facility] pharmacy yesterday.     Call returned to pt's wife Vero. Acknowledge . Advised of the above. She verbalized understanding and states that [facility] reached out to them that pt's med is ready.

## 2024-04-05 ENCOUNTER — TELEPHONE (OUTPATIENT)
Dept: FAMILY MEDICINE CLINIC | Facility: CLINIC | Age: 74
End: 2024-04-05

## 2024-04-11 ENCOUNTER — TELEPHONE (OUTPATIENT)
Age: 74
End: 2024-04-11

## 2024-04-11 NOTE — TELEPHONE ENCOUNTER
Writer SRIDHAR for patient to return our call in regards to the neuropsychological testing wait list for [facility]. And weather the patient would want to be left on the wait list or wishes to be removed from the wait list.   Writer meng number #[phone]

## 2024-05-21 ENCOUNTER — TELEPHONE (OUTPATIENT)
Dept: NEUROLOGY | Facility: CLINIC | Age: 74
End: 2024-05-21

## 2024-05-21 NOTE — TELEPHONE ENCOUNTER
contacted the patient to confirmed appointment on 06/03/24, spoke to the wife who requested the appointment be canceled stating that she will call back to reschedule.    Appointment canceled